# Patient Record
Sex: MALE | Race: WHITE | NOT HISPANIC OR LATINO | Employment: FULL TIME | URBAN - METROPOLITAN AREA
[De-identification: names, ages, dates, MRNs, and addresses within clinical notes are randomized per-mention and may not be internally consistent; named-entity substitution may affect disease eponyms.]

---

## 2017-09-06 ENCOUNTER — TRANSCRIBE ORDERS (OUTPATIENT)
Dept: ADMINISTRATIVE | Facility: HOSPITAL | Age: 27
End: 2017-09-06

## 2017-09-06 DIAGNOSIS — I82.402 ACUTE EMBOLISM AND THROMBOSIS OF DEEP VEIN OF LEFT LOWER EXTREMITY (HCC): Primary | ICD-10-CM

## 2017-09-08 ENCOUNTER — HOSPITAL ENCOUNTER (OUTPATIENT)
Dept: ULTRASOUND IMAGING | Facility: HOSPITAL | Age: 27
Discharge: HOME | End: 2017-09-08
Admitting: NURSE PRACTITIONER

## 2017-09-08 DIAGNOSIS — I82.402 ACUTE EMBOLISM AND THROMBOSIS OF DEEP VEIN OF LEFT LOWER EXTREMITY (HCC): ICD-10-CM

## 2017-09-08 PROCEDURE — 93971 EXTREMITY STUDY: CPT

## 2023-09-25 ENCOUNTER — OFFICE VISIT (OUTPATIENT)
Dept: SLEEP MEDICINE | Facility: HOSPITAL | Age: 33
End: 2023-09-25

## 2023-09-25 VITALS
SYSTOLIC BLOOD PRESSURE: 134 MMHG | DIASTOLIC BLOOD PRESSURE: 80 MMHG | WEIGHT: 315 LBS | OXYGEN SATURATION: 97 % | BODY MASS INDEX: 40.43 KG/M2 | HEART RATE: 76 BPM | HEIGHT: 74 IN

## 2023-09-25 DIAGNOSIS — G47.33 OBSTRUCTIVE SLEEP APNEA: Primary | ICD-10-CM

## 2023-09-25 DIAGNOSIS — G47.10 HYPERSOMNOLENCE: ICD-10-CM

## 2023-09-25 DIAGNOSIS — E66.01 OBESITY, MORBID, BMI 40.0-49.9: ICD-10-CM

## 2023-09-25 PROCEDURE — G0463 HOSPITAL OUTPT CLINIC VISIT: HCPCS

## 2023-09-25 RX ORDER — ZOLPIDEM TARTRATE 5 MG/1
TABLET ORAL
Qty: 2 TABLET | Refills: 0 | Status: SHIPPED | OUTPATIENT
Start: 2023-09-25

## 2023-09-25 NOTE — PROGRESS NOTES
"[facility] [place] [facility]  [address]  [place] [address]  [phone]    Referring Physician: Sabine Hyman  PCP: Sabine Hyman APRN    Reason for consult:  Sleep disorders of JULITA    Sotero Linda is a 33 y.o.male was seen in the [facility] today. Has prior dx of JULITA and on device since age 14.Has not seen anyone for few years, previously with [facility] and [facility].His machine is over 5 years old. His wife notices snoring even with mask and machine on. He uses ffm. Last changed 6 months ago. Recently had elevated BP and now on meds.  [place] Sleepiness Score: 6. Caffeine intake 2 per day. Alcohol intake 3 per week.    Sotero Linda  has a past medical history of Arthritis and History of blood clots. ADD, HTN    Current Medications:  No current outpatient medications on file.  HcTz, Lisinopril also listed in Sleep Questionnaire.    FH: family history includes Heart attack in his father, maternal grandfather, and paternal grandfather.  SH:  reports that he quit smoking about 6 years ago. He does not have any smokeless tobacco history on file. He reports current alcohol use.    Pertinent Positive Review of Systems of denies  Rest of Review of Systems was negative as recorded in Sleep Questionnaire.        Vital Signs: /80   Pulse 76   Ht 188 cm (74\")   Wt (!) 166 kg (367 lb)   SpO2 97%   BMI 47.12 kg/m²     Body mass index is 47.12 kg/m².       Tongue: Large       Dentition: good       Pharynx: Posterior pharyngeal pillars are unable to see   Mallampatti: IV (only hard palate visible)        General: Alert. Cooperative. Well developed. No acute distress.             Head:  Normocephalic. Symmetrical. Atraumatic.              Eyes: Sclera clear. No icterus. PERRLA. Normal EOM.             Ears: No deformities. Normal hearing.             Nose: No septal deviation. No drainage.          Throat: No oral lesions. No thrush. Moist mucous membranes.    Chest " Wall:  Normal shape. Symmetric expansion with respiration. No tenderness.             Neck:  Trachea midline.           Lungs:  Clear to auscultation bilaterally. No wheezes. No rhonchi. No rales. Respirations regular, even and unlabored.            Heart:  Regular rhythm and normal rate. Normal S1 and S2. No murmur.     Abdomen:  Soft, non-tender and non-distended. Normal bowel sounds. No masses.  Extremities:  Moves all extremities well. No edema.           Pulses: Pulses palpable and equal bilaterally.               Skin: Dry. Intact. No bleeding. No rash.           Neuro: Moves all 4 extremities and cranial nerves grossly intact.  Psychiatric: Normal mood and affect.    Study:  Done in the past at [facility] but presently unavailable    Report:  Older REMstar device 100% compliance average use of 7 hours AHI 1.7 CPAP is set pressure 18 cm      Impression:  1. Obstructive sleep apnea    2. Obesity, morbid, BMI 40.0-49.9    3. Hypersomnolence          Orders Placed This Encounter   Procedures    SCANNED - PULMONARY RESULTS            Plan:  Sotero requires reevaluation of his sleep apnea.  We certainly need a diagnostic study to satisfy insurance.  However I am more interested in a titration study as patient appears to be snoring on current set pressures of 18 cm.  He may require a BiPAP device as he is already at the upper end of the pressures with a CPAP machine.  I requested the original diagnostic study from [facility] but so far unable to obtain.  We will therefore order a split-night sleep study.  Once results are available patient would need prescription for a new machine and thereafter follow-up in sleep center.    I reiterated the importance of effective treatment of obstructive sleep apnea with PAP machine.  Cardiovascular health risks of untreated sleep apnea were again reviewed.  Patient was asked to remain cautious if there is persistent hypersomnolence.    Change of PAP supplies regularly is important  for effective use.  Change of cushion on the mask or plugs on nasal pillows along with disposable filters once every month and change of mask frame, tubing, headgear and Velcro straps every 6 months at the minimum was reiterated.    Weight reduction to achieve an ideal BMI will decrease the severity of obstructive sleep apnea.  Portion limitation and regular exercise was emphasized.    Patient will follow up in this clinic after study    Thank you for allowing me to participate in your patient's care.    Electronically signed by Efren Zepeda MD, 09/25/23, 2:07 PM EDT.    Part of this note may be an electronic transcription/translation of spoken language to printed text using the Dragon Dictation System.

## 2023-10-03 DIAGNOSIS — G47.33 OBSTRUCTIVE SLEEP APNEA: Primary | ICD-10-CM

## 2023-10-03 DIAGNOSIS — E66.01 OBESITY, MORBID, BMI 40.0-49.9: ICD-10-CM

## 2023-10-03 DIAGNOSIS — G47.10 HYPERSOMNOLENCE: ICD-10-CM

## 2023-10-19 ENCOUNTER — HOSPITAL ENCOUNTER (OUTPATIENT)
Dept: SLEEP MEDICINE | Facility: HOSPITAL | Age: 33
End: 2023-10-19
Payer: COMMERCIAL

## 2023-10-19 DIAGNOSIS — E66.01 OBESITY, MORBID, BMI 40.0-49.9: ICD-10-CM

## 2023-10-19 DIAGNOSIS — G47.33 OBSTRUCTIVE SLEEP APNEA: ICD-10-CM

## 2023-10-19 DIAGNOSIS — G47.10 HYPERSOMNOLENCE: ICD-10-CM

## 2023-10-19 PROCEDURE — 95811 POLYSOM 6/>YRS CPAP 4/> PARM: CPT

## 2023-11-05 DIAGNOSIS — G47.33 OBSTRUCTIVE SLEEP APNEA: Primary | ICD-10-CM

## 2023-11-10 ENCOUNTER — TELEPHONE (OUTPATIENT)
Dept: SLEEP MEDICINE | Facility: HOSPITAL | Age: 33
End: 2023-11-10
Payer: COMMERCIAL

## 2023-12-07 ENCOUNTER — TELEPHONE (OUTPATIENT)
Dept: SLEEP MEDICINE | Facility: HOSPITAL | Age: 33
End: 2023-12-07
Payer: COMMERCIAL

## 2023-12-07 NOTE — TELEPHONE ENCOUNTER
Patient called advised he is falling asleep while driving, states snoring a lot more with new machine.   I have send to have download placed in EPIC for review, EPIC message to Dr. Lagos.

## 2023-12-12 ENCOUNTER — TELEPHONE (OUTPATIENT)
Dept: SLEEP MEDICINE | Facility: HOSPITAL | Age: 33
End: 2023-12-12
Payer: COMMERCIAL

## 2023-12-12 NOTE — TELEPHONE ENCOUNTER
----- Message from Efren Zepeda MD sent at 12/11/2023  4:48 PM EST -----  Regarding: RE: possible pressure change  If he continues to report excessive sleepiness with good compliance, may need to consider stimulants.    Dr. Zepeda  ----- Message -----  From: Vanessa Varela APRN  Sent: 12/11/2023  10:23 AM EST  To: Efren Zepeda MD; Suad Doyle  Subject: RE: possible pressure change                     Suad      Download reviewed. It shows avg AHI 7/hr, IPAP max 20, EPAP min 15, PS 4, avg pr 19/16.    Please increase IPAP max to 22 and EPAP min to 17. Have pt call if he continues to report excessive sleepiness.    ----- Message -----  From: Suad Doyle  Sent: 12/11/2023   9:24 AM EST  To: LASHONDA Bacon  Subject: RE: possible pressure change                     Just requested again to have the download placed in EPIC.   ----- Message -----  From: Vanessa Varela APRN  Sent: 12/8/2023  11:56 AM EST  To: Efren Zepeda MD; Suad Doyle  Subject: RE: possible pressure change                     I don't see the download yet in the system.     ----- Message -----  From: Suad Doyle  Sent: 12/7/2023   4:01 PM EST  To: Efren Zepeda MD; LASHONDA Bacon  Subject: possible pressure change                         Patient called advised he is falling asleep while driving, states snoring a lot more with new machine.   I have requested to have download placed in EPIC for review.     Thank you

## 2023-12-12 NOTE — TELEPHONE ENCOUNTER
Pressure change in AIRVIEW     12/12/2023, 11:49 AM    by Gm Moe    Settings change detected on device    Set Mode to VAuto  Set Min EPAP to 17.0 cmH2O  Set Max IPAP to 22.0 cmH2O  Set Pressure support to 4.0 cmH2O  Set Ramp enable to Off  Set Start EPAP to 4.0 cmH2O

## 2024-04-15 ENCOUNTER — HOSPITAL ENCOUNTER (OUTPATIENT)
Dept: ULTRASOUND IMAGING | Facility: HOSPITAL | Age: 34
Discharge: HOME | End: 2024-04-15
Payer: COMMERCIAL

## 2024-04-15 ENCOUNTER — HOSPITAL ENCOUNTER (OUTPATIENT)
Dept: GENERAL RADIOLOGY | Facility: HOSPITAL | Age: 34
Discharge: HOME | End: 2024-04-15
Payer: COMMERCIAL

## 2024-04-15 ENCOUNTER — TRANSCRIBE ORDERS (OUTPATIENT)
Dept: ADMINISTRATIVE | Facility: HOSPITAL | Age: 34
End: 2024-04-15
Payer: COMMERCIAL

## 2024-04-15 DIAGNOSIS — G47.33 OBSTRUCTIVE SLEEP APNEA: ICD-10-CM

## 2024-04-15 DIAGNOSIS — M79.605 PAIN OF LEFT LOWER EXTREMITY: Primary | ICD-10-CM

## 2024-04-15 DIAGNOSIS — R60.9 EDEMA, UNSPECIFIED TYPE: ICD-10-CM

## 2024-04-15 DIAGNOSIS — M79.605 PAIN OF LEFT LOWER EXTREMITY: ICD-10-CM

## 2024-04-15 PROCEDURE — 93971 EXTREMITY STUDY: CPT

## 2024-04-15 PROCEDURE — 73600 X-RAY EXAM OF ANKLE: CPT

## 2025-02-10 ENCOUNTER — DOCUMENTATION (OUTPATIENT)
Dept: SLEEP MEDICINE | Facility: HOSPITAL | Age: 35
End: 2025-02-10
Payer: COMMERCIAL

## 2025-02-10 ENCOUNTER — OFFICE VISIT (OUTPATIENT)
Dept: SLEEP MEDICINE | Facility: HOSPITAL | Age: 35
End: 2025-02-10
Payer: COMMERCIAL

## 2025-02-10 VITALS
HEIGHT: 74 IN | BODY MASS INDEX: 40.43 KG/M2 | OXYGEN SATURATION: 97 % | HEART RATE: 74 BPM | SYSTOLIC BLOOD PRESSURE: 135 MMHG | DIASTOLIC BLOOD PRESSURE: 81 MMHG | WEIGHT: 315 LBS

## 2025-02-10 DIAGNOSIS — G47.10 PERSISTENT HYPERSOMNOLENCE DISORDER: ICD-10-CM

## 2025-02-10 DIAGNOSIS — G47.33 OBSTRUCTIVE SLEEP APNEA, ADULT: Primary | ICD-10-CM

## 2025-02-10 DIAGNOSIS — E66.01 OBESITY, MORBID, BMI 40.0-49.9: ICD-10-CM

## 2025-02-10 PROCEDURE — G0463 HOSPITAL OUTPT CLINIC VISIT: HCPCS

## 2025-02-10 NOTE — PROGRESS NOTES
"[facility] [place] [facility]  [address]  [place] [address]  [phone]    PCP: Sabine Hyman APRN    Reason for visit:  Sleep disorders: JULITA    Sotero is a 35 y.o.male who was seen in the Sleep Disorders Center today. He was seen once in 2023 and had subsequent titration study. He received a new machine (BIPAP) and has been doing well with it. He sleeps from 9:30pm to 4:30am. He uses ffm. Previously rested but in last 3-4 mths he is waking up more tired and wife notes snoring even with use of BIPAP.  No recent new meds and no recent wt change. He uses a ffm. He replaces it regularly. Mask fits well. He replaced the mask when he started feeling unrefreshed but no difference. Currently on lisinopril for htn and xyzal for allergies.  [place] Sleepiness Scale is 17. Caffeine 4 per day. Alcohol 2 per week.    Sotero  reports that he quit smoking about 8 years ago. He does not have any smokeless tobacco history on file.    Pertinent Positive Review of Systems of PND, HTN  Rest of Review of Systems was negative as recorded in Sleep Questionnaire.    Patient  has a past medical history of Arthritis and History of blood clots.     Current Medications:  Lisinopril  xyzal   also entered in Sleep Questionnaire         Vital Signs: /81   Pulse 74   Ht 188 cm (74.02\")   Wt (!) 172 kg (380 lb)   SpO2 97%   BMI 48.77 kg/m²     Body mass index is 48.77 kg/m².       Tongue: Large       Dentition: good       Pharynx: Posterior pharyngeal pillars are wide   Mallampatti: II (hard and soft palate, upper portion of tonsils anduvula visible)        General: Alert. Cooperative. Well developed. No acute distress.             Nose: No septal deviation. No drainage.          Throat: No oral lesions. No thrush. Moist mucous membranes.           Lungs:  Clear to auscultation bilaterally.            Heart:  Regular rhythm and normal rate. No murmur.     Diagnostic data available to date is as below " "and was reviewed on current visit:  1989 at [facility]: Overall AHI 88.7.  Saturation below 89% for 25 minutes.  10/19/2023: Overnight titration study 9:12 PM to 4:55 AM. Sleep efficiency 87%. Sleep distribution is normal. AHI index 6.7. REM index 1.8. Supine index 7.1. Nonsupine index 4.17. Arousal index 2.4. Snoring for 2.9% of sleep time. Oxygen saturation fell below 89% for 2.4 minutes. Patient was titrated on CPAP from 12 to 20 cm water pressure. Somewhat low O2 saturations and persistent RDI elevation. Therefore switched to BiPAP. Good sleep quality at 21/17 with no apneas hypopnea's and achieved supine sleep with no desaturation. 20/16 may also work for the patient.     No results found for: \"IRON\", \"TIBC\", \"FERRITIN\"    Most current available usage data reviewed on 02/10/2025:  No scans are attached to the encounter or orders placed in the encounter.  100% compliance average 6 hours 18 minutes AHI 2.4 average pressure is 21/17    DME Company: [employer]    Prescription to [facility] for replacement supplies as below:    full face mask      Description Replacement    Nasal PILLOWS      A 7034 Nasal Pillows  every 3 mth    A 7033 Repl Nasal Pillows  2 per mth    Nasal MASK/CUSHION      A 7034 Nasal Mask/Cushion  every 3 mth    A 7032 Repl Nasal Mask/Cushion  2 per mth    Full Face MASK     x A 7030 Full Face Mask  every 3 mth   x A 7031 Repl Face Mask  1 per mth      A 4604 Heated Tubing  every 3 mth    A 7037 Standard Tubing  every 3 mth   x A 7035 Headgear  every 3 mth   x A 7046 Repl Humidifier Chamber  every 6 yrs   x A 7038 Disposable Filters  2 per mth   x A 7039 Non-disposable Filter  every 6 mth   x A 7036 Chin Strap  every 6 mth     No orders of the defined types were placed in this encounter.         Impression:  1. Obstructive sleep apnea, adult    2. Obesity, morbid, BMI 40.0-49.9    3. Persistent hypersomnolence disorder        Plan:  Sotero complains of increased sleepiness in the last 3 to 4 " months.  There does not appear to be any change in medications or weight etc.  Has mask leak but does not notice, try higher EPAP of 19.  I cautioned him that his mask leak may get worse with the higher pressures.  If the higher pressure setting does not help his EDS and if still sleepy then MSLT study and probably start stimulants.    I reiterated the importance of effective treatment of obstructive sleep apnea with PAP machine.  Cardiovascular health risks of untreated sleep apnea were again reviewed.  Patient was asked to remain cautious if there is persistent hypersomnolence. The benefit of weight loss in reducing severity of obstructive sleep apnea was discussed.  Patient would benefit from adhering to a strict diet to achieve ideal BMI.     Change of PAP supplies regularly is important for effective use.  Change of cushion on the mask or plugs on nasal pillows along with disposable filters once every month and change of mask frame, tubing, headgear and Velcro straps every 6 months at the minimum was reiterated.    Patient will follow up in this clinic in 3 months  APRN    Thank you for allowing me to participate in your patient's care.    Electronically signed by Efren Zepeda MD, 02/10/25, 1:09 PM EST.    Part of this note may be an electronic transcription/translation of spoken language to printed text using the Dragon Dictation System.

## 2025-02-10 NOTE — PROGRESS NOTES
Pressure changes made in Sleep center office Per Dr. Zepeda in AIRVIEW:    Device   [id number] - AirCurve 10 VAuto   View  02/10/2025, 11:46 AM    by Gm Moe    Settings sent to device    Set Mode to VAuto  Set Min EPAP to 19.0 cmH2O  Set Max IPAP to 22.0 cmH2O  Set Pressure support to 0.4 cmH2O  Set Ramp enable to Off  Set Start EPAP to 4.0 cmH2O  Previous Settings    Set Mode to VAuto  Set Min EPAP to 17.0 cmH2O  Set Max IPAP to 22.0 cmH2O  Set Pressure support to 4.0 cmH2O  Set Ramp enable to Off  Set Start EPAP to 4.0 cmH2O  12/12/2023, 11:49 AM    by Gm Moe    Settings change detected on device    Set Mode to VAuto  Set Min EPAP to 17.0 cmH2O  Set Max IPAP to 22.0 cmH2O  Set Pressure support to 4.0 cmH2O  Set Ramp enable to Off  Set Start EPAP to 4.0 cmH2O